# Patient Record
(demographics unavailable — no encounter records)

---

## 2025-03-21 NOTE — REASON FOR VISIT
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

## 2025-03-21 NOTE — REASON FOR VISIT
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

## 2025-04-03 NOTE — CONSULT LETTER
[FreeTextEntry1] : Dear Dr. MISSAEL CORTEZ,      I had the pleasure of consulting on [facility] today.  Below is my note regarding the office visit today.      Thank you so very much for allowing me to participate in CHARLETTE's care.  Please don't hesitate to call me should any questions or issues arise.        Sincerely,     Dalton Hawk MD, FACS, [facility]    Chief, Pediatric Urology    Professor of Urology and Pediatrics    [employer]        President, [employer] - [place] Section    Past-President, Societies for Pediatric U[employer]

## 2025-04-03 NOTE — HISTORY OF PRESENT ILLNESS
[TextBox_4] : CHARLETTE is here for consultation. He is a healthy boy who was noted at a recent examination to have testes that were not completely descended. This has not been an issue previously. No modifying factors. No history of known trauma or episodes of redness or swelling. No episodes of pain or swelling in either testis.

## 2025-04-03 NOTE — ASSESSMENT
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 This occurs in the minority of boys with retractile testes. They do not descend and therefore require surgery to bring the testis down. I explained the general principles of the surgery using drawings, the anticipated postoperative course and discussed the possible risks which include but are not limited to infection, bleeding, testis atrophy or loss, hydrocele formation, incomplete positioning of the testis. All questions were answered.

## 2025-04-03 NOTE — CONSULT LETTER
[FreeTextEntry1] : Dear Dr. MISSAEL CORETZ,      [facility] the [facility].  Below is my note regarding the office visit today.      Thank you so very much for allowing me to participate in CHARLETTE's care.  Please don't hesitate to call me should any questions or issues arise.        Sincerely,     Dalton Hawk MD, FACS, [facility]    Chief, Pediatric Urology    Professor of Urology and Pediatrics    [employer]        President, [employer] Association - [place] Section    Past-President, Societies [employer] Pediatric U[employer]

## 2025-04-03 NOTE — CONSULT LETTER
[FreeTextEntry1] : Dear Dr. MISSAEL CORTEZ,      I had the pleasure of consulting on [email or web address] today.  Below is my note regarding the office visit today.      Thank you so very much for allowing me to participate in CHARLETTE's care.  Please don't hesitate to call me should any questions or issues arise.        Sincerely,     Dalton Hawk MD, FACS, [facility]    Chief, Pediatric Urology    Professor of Urology and Pediatrics    [employer]        President, [employer] Association - [place] Section    Past-President, Societies [employer] Pediatric U[employer]

## 2025-04-03 NOTE — PHYSICAL EXAM
[TextBox_92] :  Penis: Circumcised, straight without redundant skin, adhesions or skin bridges; distinct penoscrotal and penopubic junctions. Meatus orthotopic without apparent stenosis. Testicles: Right testis at upper scrotum with resistance while left testis in dependent position of scrotum without masses or tenderness. Scrotal/Inguinal: No palpable inguinal hernias, hydroceles, varicocele